# Patient Record
(demographics unavailable — no encounter records)

---

## 2025-06-09 NOTE — HISTORY OF PRESENT ILLNESS
[FreeTextEntry1] : Patient is a 29 yo F 31 weeks pregnant presenting for recurrent hypokalemia  AI generated note with edits made where needed: 31-week pregnant patient referred by OB for persistent hypokalemia. History of low potassium requiring IV supplementation during pregnancy. Currently asymptomatic but concerned about cardiac effects. - Chief Complaint (CC) : Persistent low potassium levels during pregnancy - History of Present Illness (HPI) : Patient is a 31-week pregnant woman referred by her OB for evaluation of persistent hypokalemia. She reports a history of low potassium levels throughout her pregnancy, with one instance requiring IV supplementation when her level dropped to 2.3. The IV administration was described as painful. She has been taking oral potassium supplements and trying to increase dietary potassium intake. Patient reports intermittent diarrhea 2-3 times per week. No other significant symptoms reported. Patient is scheduled for a planned  in 2-3 weeks. - Past Medical History : History of myoma causing  delivery in first pregnancy, miscarriage one month before current pregnancy - Past Surgical History : No significant surgical history reported - Family History : Brother with diabetes. No first degree relatives with kidney disease - Social History : Recent immigrant from the [facility]. No current alcohol use, casual alcohol use in college. No smoking or drug use history. - Review of Systems : Denies nausea, reports food cravings. No other significant symptoms reported. - Medications : Prenatal vitamins, aspirin, previously on lactulose for constipation until 22 weeks of pregnancy - Allergies : No allergies reported Objective: - Diagnostic Results : Previous potassium level of 2.3 requiring IV supplementation. Recent level around 3.2. Positive Quantiferon test in April, negative chest X-ray. No evidence of diabetes. - Vital Signs : Blood pressure 102/60 mmHg - Physical Examination (PE) : Physical examination unremarkable. No significant edema noted beyond normal pregnancy-related swelling.  Best call back - [phone]  Quant positive xray negative, tx for latent tb after pregnancy?

## 2025-06-09 NOTE — PHYSICAL EXAM
[General Appearance - Alert] : alert [General Appearance - In No Acute Distress] : in no acute distress [Sclera] : the sclera and conjunctiva were normal [PERRL With Normal Accommodation] : pupils were equal in size, round, and reactive to light [Extraocular Movements] : extraocular movements were intact [Outer Ear] : the ears and nose were normal in appearance [Oropharynx] : the oropharynx was normal [Neck Appearance] : the appearance of the neck was normal [Neck Cervical Mass (___cm)] : no neck mass was observed [Jugular Venous Distention Increased] : there was no jugular-venous distention [Thyroid Diffuse Enlargement] : the thyroid was not enlarged [Thyroid Nodule] : there were no palpable thyroid nodules [Respiration, Rhythm And Depth] : normal respiratory rhythm and effort [Exaggerated Use Of Accessory Muscles For Inspiration] : no accessory muscle use [Auscultation Breath Sounds / Voice Sounds] : lungs were clear to auscultation bilaterally [Heart Rate And Rhythm] : heart rate was normal and rhythm regular [Heart Sounds] : normal S1 and S2 [Heart Sounds Gallop] : no gallops [Murmurs] : no murmurs [Heart Sounds Pericardial Friction Rub] : no pericardial rub [Edema] : there was no peripheral edema [Veins - Varicosity Changes] : there were no varicosital changes [Bowel Sounds] : normal bowel sounds [Abdomen Soft] : soft [Abdomen Tenderness] : non-tender [Abdomen Mass (___ Cm)] : no abdominal mass palpated [No CVA Tenderness] : no ~M costovertebral angle tenderness [No Spinal Tenderness] : no spinal tenderness [Abnormal Walk] : normal gait [Skin Color & Pigmentation] : normal skin color and pigmentation [Involuntary Movements] : no involuntary movements were seen [Skin Turgor] : normal skin turgor [] : no rash [Cranial Nerves] : cranial nerves 2-12 were intact [No Focal Deficits] : no focal deficits [Affect] : the affect was normal [Mood] : the mood was normal [FreeTextEntry1] : Gravid

## 2025-06-09 NOTE — PHYSICAL EXAM
[General Appearance - Alert] : alert [General Appearance - In No Acute Distress] : in no acute distress [Sclera] : the sclera and conjunctiva were normal [PERRL With Normal Accommodation] : pupils were equal in size, round, and reactive to light [Extraocular Movements] : extraocular movements were intact [Oropharynx] : the oropharynx was normal [Outer Ear] : the ears and nose were normal in appearance [Neck Appearance] : the appearance of the neck was normal [Neck Cervical Mass (___cm)] : no neck mass was observed [Jugular Venous Distention Increased] : there was no jugular-venous distention [Thyroid Diffuse Enlargement] : the thyroid was not enlarged [Thyroid Nodule] : there were no palpable thyroid nodules [Respiration, Rhythm And Depth] : normal respiratory rhythm and effort [Exaggerated Use Of Accessory Muscles For Inspiration] : no accessory muscle use [Auscultation Breath Sounds / Voice Sounds] : lungs were clear to auscultation bilaterally [Heart Rate And Rhythm] : heart rate was normal and rhythm regular [Heart Sounds] : normal S1 and S2 [Heart Sounds Gallop] : no gallops [Murmurs] : no murmurs [Edema] : there was no peripheral edema [Heart Sounds Pericardial Friction Rub] : no pericardial rub [Veins - Varicosity Changes] : there were no varicosital changes [Bowel Sounds] : normal bowel sounds [Abdomen Soft] : soft [Abdomen Tenderness] : non-tender [Abdomen Mass (___ Cm)] : no abdominal mass palpated [No CVA Tenderness] : no ~M costovertebral angle tenderness [No Spinal Tenderness] : no spinal tenderness [Abnormal Walk] : normal gait [Skin Color & Pigmentation] : normal skin color and pigmentation [Involuntary Movements] : no involuntary movements were seen [Skin Turgor] : normal skin turgor [Cranial Nerves] : cranial nerves 2-12 were intact [] : no rash [No Focal Deficits] : no focal deficits [Affect] : the affect was normal [Mood] : the mood was normal [FreeTextEntry1] : Gravid

## 2025-06-09 NOTE — ADDENDUM
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

## 2025-06-09 NOTE — ASSESSMENT
[FreeTextEntry1] :  31-week pregnant patient with persistent hypokalemia of unclear etiology. Differential diagnoses include renal tubular acidosis or gastrointestinal causes of potassium loss. The hypokalemia may have contributed to previous pregnancy complications. - Problems : - Hypokalemia in pregnancy  - History of  delivery  - History of miscarriage  - Latent tuberculosis  - Differential Diagnosis : - Renal tubular acidosis  - Gastrointestinal potassium loss  - Medication-induced hypokalemia (previous lactulose use)  - Dietary insufficiency  Plan: - Summary : Initiate oral potassium supplementation and further investigate the cause of hypokalemia. Monitor potassium levels closely until delivery. Plan for post-partum follow-up to determine long-term management strategy. - Plan : - Prescribe oral potassium supplements (pills) with instructions to take with a large glass of water  - Order comprehensive blood and urine tests to investigate cause of hypokalemia  - Schedule follow-up in a few weeks with repeat blood work, to be done locally with telehealth visit  - Educate patient on importance of proper nutrition and hydration during pregnancy and breastfeeding  - Coordinate with OB regarding  plans and  management  - Plan for post-partum evaluation and potential treatment of latent TB  - Consider additional hormone tests post-partum to further evaluate etiology of hypokalemia

## 2025-06-09 NOTE — HISTORY OF PRESENT ILLNESS
[FreeTextEntry1] : Patient is a 31 yo F 31 weeks pregnant presenting for recurrent hypokalemia  AI generated note with edits made where needed: 31-week pregnant patient referred by OB for persistent hypokalemia. History of low potassium requiring IV supplementation during pregnancy. Currently asymptomatic but concerned about cardiac effects. - Chief Complaint (CC) : Persistent low potassium levels during pregnancy - History of Present Illness (HPI) : Patient is a 31-week pregnant woman referred by her OB for evaluation of persistent hypokalemia. She reports a history of low potassium levels throughout her pregnancy, with one instance requiring IV supplementation when her level dropped to 2.3. The IV administration was described as painful. She has been taking oral potassium supplements and trying to increase dietary potassium intake. Patient reports intermittent diarrhea 2-3 times per week. No other significant symptoms reported. Patient is scheduled for a planned  in 2-3 weeks. - Past Medical History : History of myoma causing  delivery in first pregnancy, miscarriage one month before current pregnancy - Past Surgical History : No significant surgical history reported - Family History : Brother with diabetes. No first degree relatives with kidney disease - Social History : Recent immigrant from the [facility]. No current alcohol use, casual alcohol use in college. No smoking or drug use history. - Review of Systems : Denies nausea, reports food cravings. No other significant symptoms reported. - Medications : Prenatal vitamins, aspirin, previously on lactulose for constipation until 22 weeks of pregnancy - Allergies : No allergies reported Objective: - Diagnostic Results : Previous potassium level of 2.3 requiring IV supplementation. Recent level around 3.2. Positive Quantiferon test in April, negative chest X-ray. No evidence of diabetes. - Vital Signs : Blood pressure 102/60 mmHg - Physical Examination (PE) : Physical examination unremarkable. No significant edema noted beyond normal pregnancy-related swelling.  Best call back - [phone]  Quant positive xray negative, tx for latent tb after pregnancy?

## 2025-06-10 NOTE — DISCUSSION/SUMMARY
[FreeTextEntry1] : Ms. EDGAR PATRICK 30 year - old F carries a FH of DM ( brother ) and mom with PPM and personal history of  delivery @ GA 30 weeks and  death is here Fitz 10, 2025 to establish care in the [facility]. Patient currently GA 32-week ( KATE 25) with persistent hypokalemia of unclear etiology on oral potassium supplementation. Per Nephrology plan to further investigate the cause of hypokalemia post partum.   # Prior history of sPEC/  delivery @ GA 30 weeks currently Pregnant:   BP Stable Encouraged Patient to monitor BP at home, keep a log, and report results back to us for evaluation.   Asa 81 mg 2 tabs QD  Additionally, encouraged a heart-healthy diet and exercise as tolerated. EKG with no acute changes.  Echocardiogram to assess the structural and valvular function.  # Hypokalemia: On K supplement Monitored by Nephrology  Patient scheduled for induction at 37- 38.6 weeks. Advised to follow up 2 weeks PP.  [EKG obtained to assist in diagnosis and management of assessed problem(s)] : EKG obtained to assist in diagnosis and management of assessed problem(s)

## 2025-06-10 NOTE — PHYSICAL EXAM

## 2025-06-10 NOTE — PHYSICAL EXAM

## 2025-06-10 NOTE — HISTORY OF PRESENT ILLNESS
[FreeTextEntry1] : Ms. EDGAR PATRICK 30 year - old F carries a FH of DM ( brother ) and mom with PPM and personal history of  delivery @ GA 30 weeks and  death  is here Fitz 10, 2025 to establish care in the [facility] heart [facility] program. Patient currently GA 32-week ( KATE 25) with persistent hypokalemia of unclear etiology on oral potassium supplementation. Per Nephrology plan to further investigate the cause of hypokalemia post partum. Denies chest pain, shortness of breath, dizziness, lightheadedness, palpitations or near syncope or syncope, orthopnea, PND and increasing lower extremity edema.   # Prior history of sPEC/  delivery @ GA 30 weeks currently Pregnant:   BP Stable Encouraged Patient to monitor BP at home, keep a log, and report results back to us for evaluation.   Asa 81 mg 2 tabs QD  Additionally, encouraged a heart-healthy diet and exercise as tolerated. EKG with no acute changes.  Echocardiogram to assess the structural and valvular function.  # Hypokalemia: On K supplement Monitored by Nephrology  Patient scheduled for induction at 37- 38.6 weeks. Advised to follow up 2 weeks PP.

## 2025-06-23 NOTE — HISTORY OF PRESENT ILLNESS
[FreeTextEntry1] : Patient is a 31 yo F 34 weeks pregnant presenting for recurrent hypokalemia, likely RTA Tentative delivery date july 13-24, planned c section, will be finalized in july Has been having intermittent loose or watery BM, now   Best call back - [phone]  Quant positive xray negative, tx for latent tb after pregnancy?   Physical Exam:  General: NAD, well appearing, pleasant HEENT: Normal facies, MMM Chest - bilateral chest rise, symmetric Abdomen - Nondistended, gravid LE - no visible edema or vascular changes Skin - visible skin no rashes or wounds  Hypokalemia - tolerating packet twice a day without issue, no new symptoms - likely RTA will need RAASi when no longer pregnant  Post partum treatment of latent TB  BPs stable low, no new swelling or signs of PEC  Labs ordered to be done when sees OB july 2

## 2025-06-23 NOTE — REASON FOR VISIT
[Home] : at home, [unfilled] , at the time of the visit. [Medical Office: ([facility])___] : at the medical office located in  [Telehealth (audio & video)] : This visit was provided via telehealth using real-time 2-way audio visual technology. [Verbal consent obtained from patient] : the patient, [unfilled] [Follow-Up] : a follow-up visit

## 2025-07-29 NOTE — HISTORY OF PRESENT ILLNESS
[Postpartum Follow Up] : postpartum follow up [Last Pap Date: ___] : Last Pap Date: [unfilled] [Delivery Date: ___] : on [unfilled] [Repeat C/S] : delivered by  section (repeat) [Female] : Delivery History: baby girl [Wt. ___] : weighing [unfilled] [Breastfeeding] : currently nursing [Discharge HCT: ___] : hematocrit level was [unfilled] [Discharge HGB: ___] : hemoglobin level was [unfilled] [Complications:___] : no complications [BF with Difficulty] : nursing without difficulty [Resumed Menses] : has not resumed her menses [Resumed [place]] : has not resumed intercourse [Chills] : no chills [Dysuria] : no dysuria [Fever] : no fever [Nausea] : no nausea [Vomiting] : no vomiting [None] : No associated symptoms are reported [Clean/Dry/Intact] : clean, dry and intact [Erythema] : not erythematous [Swelling] : not swollen [Dehiscence] : not dehisced [No [place]] : to avoid sexual intercourse [Limited ADLs] : to participate in activities of daily living with limitations [No Work] : not to work [No Housework] : not to do housework [No Sports] : not to participate in sports [de-identified] : post op check, CDI, no s/s infection, breast and formula feeding (from bottle) [de-identified] : 4 wk revisit, restrictions reviewed, post surgical s/s of infection discussed. 4 wk revisit

## 2025-07-29 NOTE — HISTORY OF PRESENT ILLNESS
[Postpartum Follow Up] : postpartum follow up [Last Pap Date: ___] : Last Pap Date: [unfilled] [Delivery Date: ___] : on [unfilled] [Repeat C/S] : delivered by  section (repeat) [Female] : Delivery History: baby girl [Wt. ___] : weighing [unfilled] [Breastfeeding] : currently nursing [Discharge HCT: ___] : hematocrit level was [unfilled] [Discharge HGB: ___] : hemoglobin level was [unfilled] [Complications:___] : no complications [BF with Difficulty] : nursing without difficulty [Resumed Menses] : has not resumed her menses [Resumed [place]] : has not resumed intercourse [Chills] : no chills [Dysuria] : no dysuria [Fever] : no fever [Nausea] : no nausea [Vomiting] : no vomiting [None] : No associated symptoms are reported [Clean/Dry/Intact] : clean, dry and intact [Erythema] : not erythematous [Swelling] : not swollen [Dehiscence] : not dehisced [No [place]] : to avoid sexual intercourse [Limited ADLs] : to participate in activities of daily living with limitations [No Work] : not to work [No Housework] : not to do housework [No Sports] : not to participate in sports [de-identified] : post op check, CDI, no s/s infection, breast and formula feeding (from bottle) [de-identified] : 4 wk revisit, restrictions reviewed, post surgical s/s of infection discussed. 4 wk revisit